# Patient Record
Sex: FEMALE | Race: WHITE | Employment: UNEMPLOYED | URBAN - NONMETROPOLITAN AREA
[De-identification: names, ages, dates, MRNs, and addresses within clinical notes are randomized per-mention and may not be internally consistent; named-entity substitution may affect disease eponyms.]

---

## 2017-04-06 LAB
T4 FREE: 0.85
TSH SERPL DL<=0.05 MIU/L-ACNC: 1.43 UIU/ML

## 2017-04-12 ENCOUNTER — OFFICE VISIT (OUTPATIENT)
Dept: ENDOCRINOLOGY | Age: 62
End: 2017-04-12

## 2017-04-12 VITALS
WEIGHT: 161.5 LBS | HEIGHT: 63 IN | SYSTOLIC BLOOD PRESSURE: 118 MMHG | BODY MASS INDEX: 28.62 KG/M2 | HEART RATE: 82 BPM | DIASTOLIC BLOOD PRESSURE: 68 MMHG | RESPIRATION RATE: 18 BRPM

## 2017-04-12 DIAGNOSIS — E03.9 ACQUIRED HYPOTHYROIDISM: Primary | ICD-10-CM

## 2017-04-12 PROCEDURE — 99213 OFFICE O/P EST LOW 20 MIN: CPT | Performed by: INTERNAL MEDICINE

## 2017-04-12 RX ORDER — LEVOTHYROXINE SODIUM 25 MCG
TABLET ORAL
Qty: 45 TABLET | Refills: 5 | Status: SHIPPED | OUTPATIENT
Start: 2017-04-12 | End: 2017-10-09 | Stop reason: SDUPTHER

## 2017-10-09 ENCOUNTER — OFFICE VISIT (OUTPATIENT)
Dept: ENDOCRINOLOGY | Age: 62
End: 2017-10-09
Payer: COMMERCIAL

## 2017-10-09 VITALS
HEIGHT: 63 IN | HEART RATE: 83 BPM | SYSTOLIC BLOOD PRESSURE: 128 MMHG | WEIGHT: 155 LBS | RESPIRATION RATE: 20 BRPM | BODY MASS INDEX: 27.46 KG/M2 | DIASTOLIC BLOOD PRESSURE: 77 MMHG

## 2017-10-09 DIAGNOSIS — E03.9 HYPOTHYROIDISM (ACQUIRED): Primary | ICD-10-CM

## 2017-10-09 PROCEDURE — 99213 OFFICE O/P EST LOW 20 MIN: CPT | Performed by: CLINICAL NURSE SPECIALIST

## 2017-10-09 RX ORDER — LEVOTHYROXINE SODIUM 25 MCG
TABLET ORAL
Qty: 45 TABLET | Refills: 5 | Status: SHIPPED | OUTPATIENT
Start: 2017-10-09 | End: 2018-05-02 | Stop reason: SDUPTHER

## 2017-10-09 ASSESSMENT — ENCOUNTER SYMPTOMS
COUGH: 0
EYE REDNESS: 0
DIARRHEA: 0
ABDOMINAL PAIN: 0
SHORTNESS OF BREATH: 0
VOICE CHANGE: 0
CHEST TIGHTNESS: 0
WHEEZING: 0
CONSTIPATION: 0
NAUSEA: 0

## 2017-10-09 NOTE — PROGRESS NOTES
[facility] PROFESSIONAL SERVS  ENDOCRINE DIABETES METABOLISM [facility]  [address]  [address]  Dept: [phone]  Dept Fax: [phone]  Loc: [phone]    Elham Britt is a 63 y.o. female who presents today for follow up evaluation for hypothyroidism diagnosed in 2011. Last visit 4/2017 with Dr. Sherel Mcburney. She is maintained on synthroid 37.5 mcg daily. Patient denies symptoms of hypo/hyperthyroid. Denies dysphagia, dysphonia, thyroid enlargement or thyroid pain  Thyroid function in goal in this month with TSH 1.27, FT4 0.83. Weight loss 6 lbs, BMI 27 with diet/exercise. Chief Complaint   Patient presents with    Hypothyroidism    Other     calling for labs        HPI:     HPI    Past Medical History:   Diagnosis Date    Back pain     Hypothyroidism     Sciatica       Past Surgical History:   Procedure Laterality Date    ABDOMINAL EXPLORATION SURGERY      ADENOIDECTOMY      APPENDECTOMY      CHOLECYSTECTOMY      ENDOMETRIAL ABLATION  2009    JOINT REPLACEMENT      TONSILLECTOMY       Family History   Problem Relation Age of Onset    Arthritis Mother     Heart Disease Father     Cancer Sister     Kidney Disease Sister     Heart Disease Brother      Social History   Substance Use Topics    Smoking status: Former Smoker     Quit date: 10/26/1998    Smokeless tobacco: Never Used    Alcohol use No      Current Outpatient Prescriptions   Medication Sig Dispense Refill    SYNTHROID 25 MCG tablet Take 1 1/2 tablets daily of synthroid 25 mcg (Total dose 37.5 mcg daily). Take in AM at least one half hour before food or other medication 45 tablet 5    metFORMIN (GLUCOPHAGE) 500 MG tablet Take 1,000 mg by mouth 2 times daily (with meals)       citalopram (CELEXA) 40 MG tablet Take 40 mg by mouth daily      gabapentin (NEURONTIN) 300 MG capsule Take  by mouth 3 times daily. 300 mg am and noon, 600 mg pm      acetaminophen (TYLENOL) 500 MG tablet Take 500 mg by mouth as needed. No current facility-administered medications for this visit. Allergies   Allergen Reactions    Codeine        Subjective:      Review of Systems   Constitutional: Negative for appetite change, chills, fatigue and unexpected weight change. HENT: Negative for hearing loss, tinnitus and voice change. Eyes: Negative for redness and visual disturbance. Respiratory: Negative for cough, chest tightness, shortness of breath and wheezing. Cardiovascular: Negative for chest pain, palpitations and leg swelling. Gastrointestinal: Negative for abdominal pain, constipation, diarrhea and nausea. Endocrine: Negative. Genitourinary: Negative for difficulty urinating, dysuria, frequency and hematuria. Musculoskeletal: Negative for gait problem, myalgias and neck pain. Chronic hip with hx of replacement right - followinng with [facility] with left hip/sciatic pain   Skin: Negative for rash. Neurological: Negative for dizziness, tremors, facial asymmetry, weakness, numbness and headaches. Hematological: Negative for adenopathy. Psychiatric/Behavioral: Negative for agitation, confusion, sleep disturbance and suicidal ideas. The patient is not nervous/anxious and is not hyperactive. Objective:     Physical Exam   Constitutional: She is oriented to person, place, and time. She appears well-developed and well-nourished. No distress. HENT:   Head: Normocephalic and atraumatic. Right Ear: External ear normal.   Left Ear: External ear normal.   Nose: Nose normal.   Eyes: Conjunctivae and EOM are normal. Pupils are equal, round, and reactive to light. Right eye exhibits no discharge. Left eye exhibits no discharge. No scleral icterus. Neck: Normal range of motion. Neck supple. No thyromegaly present. Cardiovascular: Normal rate, regular rhythm, normal heart sounds and intact distal pulses. No murmur heard. Pulmonary/Chest: Effort normal and breath sounds normal. No stridor.  No respiratory distress. She has no wheezes. She has no rales. Abdominal: Soft. Bowel sounds are normal. There is no tenderness. Musculoskeletal: Normal range of motion. She exhibits no edema or tenderness. Lymphadenopathy:     She has no cervical adenopathy. Neurological: She is alert and oriented to person, place, and time. No cranial nerve deficit. Coordination normal.   Skin: Skin is warm and dry. She is not diaphoretic. Psychiatric: She has a normal mood and affect. Her behavior is normal. Judgment and thought content normal.         Assessment:      /77 (Site: Right Arm, Position: Sitting, Cuff Size: Medium Adult)   Pulse 83   Resp 20   Ht 5' 3.39\" (1.61 m)   Wt 155 lb (70.3 kg)   BMI 27.12 kg/m²   1. Hypothyroidism (acquired) - clinically stable, asymptomatic with thyroid function in goal on current replacement dose - to continue. Discussed symptoms of hypo/hyperthyroid to report between visits. Repeat thyroid labs in 6 months with follow up one year. Plan:      Continue synthroid 25 mcg tablet - 1.5 tablets daily. Take in AM with water at least one half hour before food or other medication  Thyroid labs in 6 months  Will call patient pending lab results     Return in about 1 year (around 10/9/2018).        Electronically signed by ASHVIN Hinton on 10/9/2017 at 11:52 AM

## 2017-10-09 NOTE — PATIENT INSTRUCTIONS
Continue synthroid 25 mcg tablet - 1.5 tablets daily. Take in AM with water at least one half hour before food or other medication  Call between visits for symptoms of hypo/hyperthyroid as discussed  Thyroid labs in 6 months  Follow up one year    Thank you for enrolling in [address] Please follow the instructions below to securely access your online medical record. [employer] allows you to send messages to your doctor, view your test results, renew your prescriptions, schedule appointments, and more. How Do I Sign Up? 1. In your Internet browser, go to [email or web address]  2. Click on the Sign Up Now link in the Sign In box. You will see the New Member Sign Up page. 3. Enter your [employer] Access Code exactly as it appears below. You will not need to use this code after youve completed the sign-up process. If you do not sign up before the expiration date, you must request a new code. [employer] Access Code: [email or web address]  Expires: 12/8/2017 10:46 AM    4. Enter your Social Security Number ([id number]) and Date of Birth (mm/dd/yyyy) as indicated and click Submit. You will be taken to the next sign-up page. 5. Create a [employer] ID. This will be your [employer] login ID and cannot be changed, so think of one that is secure and easy to remember. 6. Create a [employer] password. You can change your password at any time. 7. Enter your Password Reset Question and Answer. This can be used at a later time if you forget your password. 8. Enter your e-mail address. You will receive e-mail notification when new information is available in [address] 9. Click Sign Up. You can now view your medical record. Additional Information  If you have questions, please contact your physician practice where you receive care. Remember, [employer] is NOT to be used for urgent needs. For medical emergencies, dial 911.

## 2018-04-03 LAB
T4 FREE: 0.78
TSH SERPL DL<=0.05 MIU/L-ACNC: 1.76 UIU/ML

## 2018-04-05 ENCOUNTER — TELEPHONE (OUTPATIENT)
Dept: ENDOCRINOLOGY | Age: 63
End: 2018-04-05

## 2018-04-05 DIAGNOSIS — E03.9 ACQUIRED HYPOTHYROIDISM: Primary | ICD-10-CM

## 2018-05-02 RX ORDER — LEVOTHYROXINE SODIUM 25 MCG
TABLET ORAL
Qty: 45 TABLET | Refills: 5 | Status: SHIPPED | OUTPATIENT
Start: 2018-05-02

## 2021-04-26 ENCOUNTER — OFFICE VISIT (OUTPATIENT)
Dept: OBGYN CLINIC | Age: 66
End: 2021-04-26
Payer: COMMERCIAL

## 2021-04-26 VITALS — WEIGHT: 160.8 LBS | BODY MASS INDEX: 28.14 KG/M2 | DIASTOLIC BLOOD PRESSURE: 96 MMHG | SYSTOLIC BLOOD PRESSURE: 142 MMHG

## 2021-04-26 DIAGNOSIS — N88.8 NABOTHIAN CYST: Primary | ICD-10-CM

## 2021-04-26 PROCEDURE — G8419 CALC BMI OUT NRM PARAM NOF/U: HCPCS | Performed by: NURSE PRACTITIONER

## 2021-04-26 PROCEDURE — 3017F COLORECTAL CA SCREEN DOC REV: CPT | Performed by: NURSE PRACTITIONER

## 2021-04-26 PROCEDURE — G8427 DOCREV CUR MEDS BY ELIG CLIN: HCPCS | Performed by: NURSE PRACTITIONER

## 2021-04-26 PROCEDURE — 1036F TOBACCO NON-USER: CPT | Performed by: NURSE PRACTITIONER

## 2021-04-26 PROCEDURE — 99203 OFFICE O/P NEW LOW 30 MIN: CPT | Performed by: NURSE PRACTITIONER

## 2021-04-26 RX ORDER — CITALOPRAM 20 MG/1
TABLET ORAL
COMMUNITY
Start: 2021-01-26

## 2021-04-26 RX ORDER — PHENOL 1.4 %
1 AEROSOL, SPRAY (ML) MUCOUS MEMBRANE NIGHTLY
COMMUNITY

## 2021-04-26 RX ORDER — ATORVASTATIN CALCIUM 20 MG/1
TABLET, FILM COATED ORAL
COMMUNITY
Start: 2021-03-09

## 2021-04-26 NOTE — PROGRESS NOTES
PROBLEM VISIT     Date of service: 2021    Dorothy Urrutia  Is a 63 y.o.  female    PT's PCP is: Emma Oreilly MD     : 1955                                             Subjective:       No LMP recorded. Patient has had an ablation. OB History    Para Term  AB Living   3 3 3     2   SAB TAB Ectopic Molar Multiple Live Births             2      # Outcome Date GA Lbr Cem/2nd Weight Sex Delivery Anes PTL Lv   3 Term 18 41w0d   M Vag-Spont   NHUNG   2 Term 0 42w0d   M Vag-Spont   NHUNG   1 Term     F Vag-Spont         Obstetric Comments   Last daughter  at age 32 10/2020        Social History     Tobacco Use   Smoking Status Former Smoker    Quit date: 10/26/1998    Years since quittin.5   Smokeless Tobacco Never Used        Social History     Substance and Sexual Activity   Alcohol Use No       Allergies: Codeine      Current Outpatient Medications:     citalopram (CELEXA) 20 MG tablet, TAKE 1 TABLET BY MOUTH EVERY DAY, Disp: , Rfl:     atorvastatin (LIPITOR) 20 MG tablet, TAKE 1 TABLET BY MOUTH EVERY DAY, Disp: , Rfl:     Melatonin 10 MG TABS, Take 1 tablet by mouth nightly, Disp: , Rfl:     ASCORBIC ACID ER PO, Take by mouth, Disp: , Rfl:     CHOLECALCIFEROL PO, Take by mouth, Disp: , Rfl:     TURMERIC PO, Take by mouth, Disp: , Rfl:     SYNTHROID 25 MCG tablet, Take 1 1/2 tablets daily of synthroid 25 mcg (Total dose 37.5 mcg daily). Take in AM at least one half hour before food or other medication, Disp: 45 tablet, Rfl: 5    metFORMIN (GLUCOPHAGE) 500 MG tablet, Take 1,000 mg by mouth 2 times daily (with meals) , Disp: , Rfl:     gabapentin (NEURONTIN) 300 MG capsule, Take  by mouth 3 times daily. 300 mg am and noon, 600 mg pm, Disp: , Rfl:     acetaminophen (TYLENOL) 500 MG tablet, Take 500 mg by mouth as needed. , Disp: , Rfl:     Social History     Substance and Sexual Activity   Sexual Activity Not Currently       Chief Complaint   Patient presents with  Follow-up     Referral from PCP for cervical polyp. Pt reports no problems and no vaginal bleeding. PE:  Vital Signs  Blood pressure (!) 142/96, weight 160 lb 12.8 oz (72.9 kg). HPI: Patient presents today as referral for possible cervical polyp. Denies any pelvic pain or vaginal bleeding. PT denies fever, chills, nausea and vomiting       Review of Systems   Constitutional: Negative. Genitourinary: Negative for pelvic pain, vaginal bleeding and vaginal discharge. Possible cervical polyp       Physical Exam  Constitutional:       Appearance: Normal appearance. HENT:      Head: Normocephalic. Pulmonary:      Effort: Pulmonary effort is normal.   Genitourinary:     General: Normal vulva. Vagina: No vaginal discharge. Musculoskeletal: Normal range of motion. Neurological:      General: No focal deficit present. Mental Status: She is alert. Psychiatric:         Mood and Affect: Mood normal.         Behavior: Behavior normal.        chaperone: sister present with patient                        Assessment and Plan          Diagnosis Orders   1. Nabothian cyst         normal appearing nabothian cyst on exam, reviewed mucus, fluid or tissue can become trapped forming a cyst. Patient denies any further concerns. I am having Hiteshzenaida ZambranoMelvina maintain her gabapentin, acetaminophen, metFORMIN, Synthroid, citalopram, atorvastatin, Melatonin, ASCORBIC ACID ER PO, CHOLECALCIFEROL PO, and TURMERIC PO. Return if symptoms worsen or fail to improve. She was also counseled on her preventative health maintenance recommendations and follow-up. There are no Patient Instructions on file for this visit.     Denisha Edgar,4/26/2021 11:46 AM

## 2021-04-27 ENCOUNTER — TELEPHONE (OUTPATIENT)
Dept: OBGYN CLINIC | Age: 66
End: 2021-04-27

## 2021-04-27 NOTE — TELEPHONE ENCOUNTER
Received a call from Dr. Michelle Pate office, they are requesting office notes from patient's office visit on 4/26. They referred patient to our office for a possible polyp. Office notes faxed to [phone].